# Patient Record
Sex: MALE | Employment: FULL TIME | URBAN - METROPOLITAN AREA
[De-identification: names, ages, dates, MRNs, and addresses within clinical notes are randomized per-mention and may not be internally consistent; named-entity substitution may affect disease eponyms.]

---

## 2017-02-28 RX ORDER — MONTELUKAST SODIUM 10 MG/1
TABLET ORAL
Qty: 30 TABLET | Refills: 3 | Status: SHIPPED | OUTPATIENT
Start: 2017-02-28

## 2017-09-12 ENCOUNTER — OFFICE VISIT (OUTPATIENT)
Dept: SLEEP CENTER | Age: 47
End: 2017-09-12
Attending: INTERNAL MEDICINE
Payer: COMMERCIAL

## 2017-09-12 DIAGNOSIS — Z76.89 SLEEP CONCERN: Primary | ICD-10-CM

## 2017-09-12 PROCEDURE — 95810 POLYSOM 6/> YRS 4/> PARAM: CPT

## 2017-09-14 NOTE — PROCEDURES
[address] [facility]  Accredited by the [employer] ([employer])    PATIENT'S NAME: Panchito Ballesteros PHYSICIAN:    REFERRING PHYSICIAN:    PATIENT ACCOUNT #: [de-identified] LOCATION: [place] [id number] #: [id number] hypnotics, and caffeine. 3.   Sleep study results do not meet the criteria for CPAP titration. 4.   Pharmacological therapy for periodic limb movement disorder is recommended. Dictated By Laurel Herrmann MD  d: 09/14/2017 11:29:22  t: 09/14/2017 12:06:59

## 2018-06-07 ENCOUNTER — OFFICE VISIT (OUTPATIENT)
Dept: OTOLARYNGOLOGY | Facility: CLINIC | Age: 48
End: 2018-06-07

## 2018-06-07 ENCOUNTER — TELEPHONE (OUTPATIENT)
Dept: OTOLARYNGOLOGY | Facility: CLINIC | Age: 48
End: 2018-06-07

## 2018-06-07 VITALS
HEIGHT: 71 IN | DIASTOLIC BLOOD PRESSURE: 82 MMHG | BODY MASS INDEX: 28 KG/M2 | SYSTOLIC BLOOD PRESSURE: 118 MMHG | TEMPERATURE: 98 F | WEIGHT: 200 LBS

## 2018-06-07 DIAGNOSIS — R42 MIGRAINOUS DIZZINESS: Primary | ICD-10-CM

## 2018-06-07 PROCEDURE — 99214 OFFICE O/P EST MOD 30 MIN: CPT | Performed by: OTOLARYNGOLOGY

## 2018-06-07 PROCEDURE — 99212 OFFICE O/P EST SF 10 MIN: CPT | Performed by: OTOLARYNGOLOGY

## 2018-06-07 RX ORDER — DICLOFENAC SODIUM 75 MG/1
75 TABLET, DELAYED RELEASE ORAL
COMMUNITY
Start: 2018-03-12

## 2018-06-07 RX ORDER — TRAZODONE HYDROCHLORIDE 50 MG/1
50 TABLET ORAL
COMMUNITY
Start: 2018-03-14

## 2018-06-07 RX ORDER — IBUPROFEN 800 MG/1
TABLET ORAL
Refills: 0 | COMMUNITY
Start: 2018-06-01

## 2018-06-07 RX ORDER — ALPRAZOLAM 0.25 MG/1
TABLET ORAL
COMMUNITY
Start: 2018-03-14

## 2018-06-07 RX ORDER — IBUPROFEN 800 MG/1
800 TABLET ORAL
COMMUNITY
Start: 2018-06-01 | End: 2018-06-07

## 2018-06-07 RX ORDER — MECLIZINE HYDROCHLORIDE 25 MG/1
TABLET ORAL
Qty: 30 TABLET | Refills: 0 | Status: SHIPPED | OUTPATIENT
Start: 2018-06-07

## 2018-06-07 RX ORDER — FLUTICASONE PROPIONATE 50 MCG
SPRAY, SUSPENSION (ML) NASAL
Refills: 0 | COMMUNITY
Start: 2018-05-31

## 2018-06-07 RX ORDER — AMOXICILLIN AND CLAVULANATE POTASSIUM 875; 125 MG/1; MG/1
1 TABLET, FILM COATED ORAL EVERY 12 HOURS
Qty: 20 TABLET | Refills: 0 | Status: SHIPPED | OUTPATIENT
Start: 2018-06-07 | End: 2018-06-17

## 2018-06-07 RX ORDER — LEVOFLOXACIN 500 MG/1
500 TABLET, FILM COATED ORAL DAILY
Qty: 7 TABLET | Refills: 0 | Status: SHIPPED | OUTPATIENT
Start: 2018-06-07 | End: 2018-06-14

## 2018-06-07 RX ORDER — METHYLPREDNISOLONE 4 MG/1
TABLET ORAL
Qty: 1 PACKAGE | Refills: 0 | Status: SHIPPED | OUTPATIENT
Start: 2018-06-07

## 2018-06-07 NOTE — PROGRESS NOTES
Talha Moe is a 49year old male.   Patient presents with:  Dizziness: daily for 1.5 weeks  Ear Problem: pt states he has fluid in both ears       HISTORY OF PRESENT ILLNESS  6/4/2016 visit with PEDRO  He presents with a history of shoulder surgery with th Never Used                        Alcohol use:  No              Drug use: No            Other Topics            Concern  Caffeine Concern        Yes    Comment:Soda        Family History   Problem Relation Age of Onset   • Heart Disorder Paternal Grandfathe Normal, Left: Normal.   Neurological Normal Memory - Normal. Cranial nerves - Cranial nerves II through XII grossly intact.    Head/Face Normal Facial features - Normal. Eyebrows - Normal. Skull - Normal.        Nasopharynx Normal External nose - Normal. Mackenzie Arteaga 1  •  OXYCONTIN 10 MG Oral Tablet Extended Release 12 hour Abuse-Deterrent, , Disp: , Rfl: 0  ASSESSMENT AND PLAN    1.dizziness  I think his dizziness is multifactorial he certainly has a positional component but he has this pervasive sense of unsteadines

## 2018-06-07 NOTE — TELEPHONE ENCOUNTER
Pt asking if levaquin can be prescribed instead of amoxicillin, states it works better for him. Pls advise thank you.

## 2018-06-11 ENCOUNTER — TELEPHONE (OUTPATIENT)
Dept: OTOLARYNGOLOGY | Facility: CLINIC | Age: 48
End: 2018-06-11

## 2018-06-11 RX ORDER — BENZONATATE 100 MG/1
100 CAPSULE ORAL 3 TIMES DAILY PRN
Qty: 60 CAPSULE | Refills: 1 | Status: SHIPPED | OUTPATIENT
Start: 2018-06-11 | End: 2018-09-08

## 2018-06-11 NOTE — TELEPHONE ENCOUNTER
appt 6-7-18. Pt is not feeling any better. Pt requesting a rx. Pt also requesting a doctors note for today.   Call pt to advise

## 2018-06-11 NOTE — TELEPHONE ENCOUNTER
Pt had OV 6/7, dizziness. Per pt's wife, pt had a cough at LOV, still has bad cough, still feels like ears are full, still feeling dizzy. Pt was given a note excusing him from work until today.   Pt requesting tessalon pearls for cough, still taking Augme

## 2018-06-18 ENCOUNTER — TELEPHONE (OUTPATIENT)
Dept: ADMINISTRATIVE | Age: 48
End: 2018-06-18

## 2018-06-18 ENCOUNTER — TELEPHONE (OUTPATIENT)
Dept: OTOLARYNGOLOGY | Facility: CLINIC | Age: 48
End: 2018-06-18

## 2018-06-18 ENCOUNTER — OFFICE VISIT (OUTPATIENT)
Dept: OTOLARYNGOLOGY | Facility: CLINIC | Age: 48
End: 2018-06-18

## 2018-06-18 VITALS
HEART RATE: 90 BPM | DIASTOLIC BLOOD PRESSURE: 70 MMHG | RESPIRATION RATE: 16 BRPM | BODY MASS INDEX: 28 KG/M2 | SYSTOLIC BLOOD PRESSURE: 128 MMHG | TEMPERATURE: 99 F | HEIGHT: 71 IN | WEIGHT: 200 LBS

## 2018-06-18 DIAGNOSIS — R42 MIGRAINOUS DIZZINESS: Primary | ICD-10-CM

## 2018-06-18 PROCEDURE — 99212 OFFICE O/P EST SF 10 MIN: CPT | Performed by: OTOLARYNGOLOGY

## 2018-06-18 PROCEDURE — 99213 OFFICE O/P EST LOW 20 MIN: CPT | Performed by: OTOLARYNGOLOGY

## 2018-06-18 RX ORDER — AZITHROMYCIN 250 MG/1
TABLET, FILM COATED ORAL
Qty: 1 PACKAGE | Refills: 0 | Status: SHIPPED | OUTPATIENT
Start: 2018-06-18

## 2018-06-18 NOTE — TELEPHONE ENCOUNTER
Spoke w/ pt's wife, pt will have surgery, needs short term disab. form completed. [employer] packet emailed ([email or web address]) to pt, upset about charge.  NK

## 2018-06-18 NOTE — TELEPHONE ENCOUNTER
Pt c/o of cost from [employer] regarding short term disabilityI form to be filled out today. Per , she will fill out form. Pt will drop off today.

## 2018-06-18 NOTE — PROGRESS NOTES
Seferino Small is a 49year old male. Patient presents with:  Dizziness: follow up, dizziness improved, still with productive cough, green sputum.       HISTORY OF PRESENT ILLNESS  6/4/2016 visit with PEDRO  He presents with a history of shoulder surgery with Smoker                                                                Packs/day: 0.50      Years: 12.00       Smokeless tobacco: Never Used                        Alcohol use:  No              Drug use: No            Other Topics            Concern  Angela Normal, Left: Normal. Pupil - Right: Normal, Left: Normal. Fundus - Right: Normal, Left: Normal.   Neurological Normal Memory - Normal. Cranial nerves - Cranial nerves II through XII grossly intact.    Head/Face Normal Facial features - Normal. Eyebrows - N 1/2- 1 tablet PO every 6 hrs PRN for dizziness, Disp: 30 tablet, Rfl: 0  •  HYDROcodone-acetaminophen  MG Oral Tab, , Disp: , Rfl: 0  •  Meclizine HCl 25 MG Oral Tab, , Disp: , Rfl: 0  •  Ondansetron HCl (ZOFRAN) 4 mg tablet, , Disp: , Rfl: 1  •  OXY

## 2018-06-20 NOTE — TELEPHONE ENCOUNTER
Completed form by Dr. Neville Lights forwarded to [facility] via dept, logged and scanned under [employer] tab, n/c form completed by doctor. Call completed.  NK

## 2018-08-08 RX ORDER — LEVOFLOXACIN 500 MG/1
TABLET, FILM COATED ORAL
Qty: 7 TABLET | Refills: 0 | OUTPATIENT
Start: 2018-08-08

## 2018-09-12 RX ORDER — BENZONATATE 100 MG/1
CAPSULE ORAL
Qty: 60 CAPSULE | Refills: 1 | Status: SHIPPED | OUTPATIENT
Start: 2018-09-12

## 2021-01-29 ENCOUNTER — OFFICE VISIT (OUTPATIENT)
Dept: OTOLARYNGOLOGY | Facility: CLINIC | Age: 51
End: 2021-01-29
Payer: COMMERCIAL

## 2021-01-29 VITALS
WEIGHT: 210 LBS | TEMPERATURE: 98 F | HEIGHT: 71 IN | BODY MASS INDEX: 29.4 KG/M2 | SYSTOLIC BLOOD PRESSURE: 128 MMHG | DIASTOLIC BLOOD PRESSURE: 90 MMHG

## 2021-01-29 DIAGNOSIS — R42 DIZZINESS: Primary | ICD-10-CM

## 2021-01-29 PROCEDURE — 3080F DIAST BP >= 90 MM HG: CPT | Performed by: OTOLARYNGOLOGY

## 2021-01-29 PROCEDURE — 3008F BODY MASS INDEX DOCD: CPT | Performed by: OTOLARYNGOLOGY

## 2021-01-29 PROCEDURE — 99214 OFFICE O/P EST MOD 30 MIN: CPT | Performed by: OTOLARYNGOLOGY

## 2021-01-29 PROCEDURE — 3074F SYST BP LT 130 MM HG: CPT | Performed by: OTOLARYNGOLOGY

## 2021-01-29 RX ORDER — CELECOXIB 200 MG/1
200 CAPSULE ORAL DAILY PRN
Qty: 30 CAPSULE | Refills: 0 | Status: SHIPPED | OUTPATIENT
Start: 2021-01-29 | End: 2021-02-28

## 2021-01-29 RX ORDER — AMOXICILLIN AND CLAVULANATE POTASSIUM 875; 125 MG/1; MG/1
1 TABLET, FILM COATED ORAL EVERY 12 HOURS
Qty: 20 TABLET | Refills: 0 | Status: SHIPPED | OUTPATIENT
Start: 2021-01-29

## 2021-01-29 RX ORDER — CYCLOBENZAPRINE HCL 5 MG
5 TABLET ORAL NIGHTLY
Qty: 30 TABLET | Refills: 1 | Status: SHIPPED | OUTPATIENT
Start: 2021-01-29 | End: 2021-06-03

## 2021-01-29 NOTE — PROGRESS NOTES
Lesvia Ordoñez is a 45year old male.   Patient presents with:  Dizziness: Patient Presents with: Dizziness for 1 week       HISTORY OF PRESENT ILLNESS  He presents with a history of shoulder surgery with the onset of severe spinning vertigo about 2 weeks la file    Tobacco Use      Smoking status: Former Smoker        Packs/day: 0.50        Years: 12.00        Pack years: 6      Smokeless tobacco: Never Used    Substance and Sexual Activity      Alcohol use: No      Drug use: No    Other Topics      Concerns: Normal.   Eyes Normal Conjunctiva - Right: Normal, Left: Normal. Pupil - Right: Normal, Left: Normal. Fundus - Right: Normal, Left: Normal.   Neurological Normal Memory - Normal. Cranial nerves - Cranial nerves II through XII grossly intact.    Head/Face No (ZITHROMAX Z-NICOLE) 250 MG Oral Tab, Take 1 by oral route every day for 5 days. 2 tablets today.  (Patient not taking: Reported on 1/29/2021 ), Disp: 1 Package, Rfl: 0  •  ALPRAZolam 0.25 MG Oral Tab, 1 tab as needed for flying, Disp: , Rfl:   •  Diclofenac S still exist    Jalen Medeiros MD    1/29/2021    2:20 PM

## 2021-01-30 ENCOUNTER — TELEPHONE (OUTPATIENT)
Dept: OTOLARYNGOLOGY | Facility: CLINIC | Age: 51
End: 2021-01-30

## 2021-01-30 NOTE — TELEPHONE ENCOUNTER
Dr Urbano American patient stated started taking the medications prescribed yesterday and feeling  little better ,advised to continue taking the medications,and reminded the need to follow up in a week ,pt verbalized understanding.

## 2021-02-02 ENCOUNTER — TELEPHONE (OUTPATIENT)
Dept: OTOLARYNGOLOGY | Facility: CLINIC | Age: 51
End: 2021-02-02

## 2021-02-02 NOTE — TELEPHONE ENCOUNTER
Susan Zayas MD  [facility]             Please contact patient Saturday morning to make sure that he is doing better

## 2021-02-08 DIAGNOSIS — Z11.59 SPECIAL SCREENING EXAMINATION FOR UNSPECIFIED VIRAL DISEASE: Primary | ICD-10-CM

## 2021-02-10 ENCOUNTER — LAB SERVICES (OUTPATIENT)
Dept: LAB | Age: 51
End: 2021-02-10

## 2021-02-10 LAB
SARS-COV-2 RNA RESP QL NAA+PROBE: NOT DETECTED
SERVICE CMNT-IMP: NORMAL
SERVICE CMNT-IMP: NORMAL

## 2021-02-10 PROCEDURE — U0003 INFECTIOUS AGENT DETECTION BY NUCLEIC ACID (DNA OR RNA); SEVERE ACUTE RESPIRATORY SYNDROME CORONAVIRUS 2 (SARS-COV-2) (CORONAVIRUS DISEASE [COVID-19]), AMPLIFIED PROBE TECHNIQUE, MAKING USE OF HIGH THROUGHPUT TECHNOLOGIES AS DESCRIBED BY CMS-2020-01-R: HCPCS | Performed by: PSYCHIATRY & NEUROLOGY

## 2021-02-10 PROCEDURE — U0005 INFEC AGEN DETEC AMPLI PROBE: HCPCS | Performed by: PSYCHIATRY & NEUROLOGY

## 2021-04-02 DIAGNOSIS — Z11.59 SPECIAL SCREENING EXAMINATION FOR UNSPECIFIED VIRAL DISEASE: Primary | ICD-10-CM

## 2021-06-02 NOTE — TELEPHONE ENCOUNTER
LOV 1/29/21, RTC 1 week, no folllow up.     This refill request is being sent to the provider for the following reason:    [x]Patient did not complete follow up recommendations

## 2021-06-03 RX ORDER — CYCLOBENZAPRINE HCL 5 MG
TABLET ORAL
Qty: 30 TABLET | Refills: 1 | Status: SHIPPED | OUTPATIENT
Start: 2021-06-03 | End: 2021-08-04

## 2021-08-04 RX ORDER — CYCLOBENZAPRINE HCL 5 MG
TABLET ORAL
Qty: 30 TABLET | Refills: 1 | Status: SHIPPED | OUTPATIENT
Start: 2021-08-04 | End: 2021-10-20

## 2021-08-04 NOTE — TELEPHONE ENCOUNTER
This refill request is being sent to the provider for the following reason:  []Patient has not had an appointment within the past 12 months but has made an appointment on: ___  [x]Medication is not within protocol  []Patient did not complete follow up garret

## 2021-09-23 ENCOUNTER — OFFICE VISIT (OUTPATIENT)
Dept: OTOLARYNGOLOGY | Facility: CLINIC | Age: 51
End: 2021-09-23
Payer: COMMERCIAL

## 2021-09-23 VITALS — BODY MASS INDEX: 31.5 KG/M2 | WEIGHT: 225 LBS | TEMPERATURE: 97 F | HEIGHT: 71 IN

## 2021-09-23 DIAGNOSIS — H92.03 OTALGIA OF BOTH EARS: Primary | ICD-10-CM

## 2021-09-23 PROCEDURE — 99213 OFFICE O/P EST LOW 20 MIN: CPT | Performed by: OTOLARYNGOLOGY

## 2021-09-23 PROCEDURE — 3008F BODY MASS INDEX DOCD: CPT | Performed by: OTOLARYNGOLOGY

## 2021-10-10 NOTE — PROGRESS NOTES
Venkata Bradley is a 45year old male. Patient presents with:  Ear Pain: Pt has been having ear pain bilaterally past 5 days. States he was seen at urgent care yesterday. Pt received Augment and antibiotic ear drops yesterday.        HISTORY OF PRESENT ILLNES ear infection and started on Augmentin and antibiotic eardrops. Feels slightly better today.   Social History    Socioeconomic History      Marital status:     Tobacco Use      Smoking status: Former Smoker        Packs/day: 0.50        Years: 12.00 Inspection - Normal. Palpation - Normal. Parotid gland - Normal. Thyroid gland - Normal.   Eyes Normal Conjunctiva - Right: Normal, Left: Normal. Pupil - Right: Normal, Left: Normal. Fundus - Right: Normal, Left: Normal.   Neurological Normal Memory - Norm Diclofenac Sodium 75 MG Oral Tab EC, Take 75 mg by mouth. (Patient not taking: Reported on 9/22/2021), Disp: , Rfl:   •  TraZODone HCl 50 MG Oral Tab, Take 50 mg by mouth.  (Patient not taking: Reported on 9/22/2021), Disp: , Rfl:   •  Fluticasone Propionat

## 2021-10-20 RX ORDER — CYCLOBENZAPRINE HCL 5 MG
TABLET ORAL
Qty: 30 TABLET | Refills: 1 | Status: SHIPPED | OUTPATIENT
Start: 2021-10-20 | End: 2022-01-23

## 2022-01-23 RX ORDER — CYCLOBENZAPRINE HCL 5 MG
TABLET ORAL
Qty: 30 TABLET | Refills: 1 | Status: SHIPPED | OUTPATIENT
Start: 2022-01-23

## 2022-03-28 PROBLEM — Z95.5 STATUS POST PRIMARY ANGIOPLASTY WITH CORONARY STENT: Status: ACTIVE | Noted: 2022-03-28

## 2022-03-28 PROBLEM — I25.119 CORONARY ARTERY DISEASE INVOLVING NATIVE CORONARY ARTERY OF NATIVE HEART WITH ANGINA PECTORIS (HCC): Status: ACTIVE | Noted: 2022-03-28

## 2022-04-09 PROBLEM — K63.8219 SMALL INTESTINAL BACTERIAL OVERGROWTH: Status: ACTIVE | Noted: 2022-04-09

## 2022-04-09 PROBLEM — M54.16 LUMBAR RADICULOPATHY: Status: ACTIVE | Noted: 2019-03-11

## 2022-04-09 PROBLEM — K63.89 SMALL INTESTINAL BACTERIAL OVERGROWTH: Status: ACTIVE | Noted: 2022-04-09

## 2022-04-09 PROBLEM — F17.223: Status: ACTIVE | Noted: 2019-02-28

## 2022-05-20 NOTE — TELEPHONE ENCOUNTER
This refill request is being sent to the provider for the following reason:  []Patient has not had an appointment within the past 12 months but has made an appointment on: ___  [x]Medication is not within protocol LOV 09/23/2021  []Patient did not complete follow up recommendations  []Other: ___

## 2022-05-21 RX ORDER — CYCLOBENZAPRINE HCL 5 MG
TABLET ORAL
Qty: 30 TABLET | Refills: 1 | Status: SHIPPED | OUTPATIENT
Start: 2022-05-21

## 2022-08-03 NOTE — TELEPHONE ENCOUNTER
LOV 9/23/21, no appt    This refill request is being sent to the provider for the following reason:  [x]Medication is not within protocol

## 2022-08-07 RX ORDER — CYCLOBENZAPRINE HCL 5 MG
TABLET ORAL
Qty: 30 TABLET | Refills: 1 | Status: SHIPPED | OUTPATIENT
Start: 2022-08-07

## 2022-10-14 RX ORDER — CYCLOBENZAPRINE HCL 5 MG
TABLET ORAL
Qty: 30 TABLET | Refills: 1 | OUTPATIENT
Start: 2022-10-14

## 2023-03-07 NOTE — TELEPHONE ENCOUNTER
This refill request is being sent to the provider for the following reason:  []Patient has not had an appointment within the past 12 months but has made an appointment on: ___  []Medication is not within protocol  [x]Patient did not complete follow up recommendations  []Other: ___  LOV was 9/23/21. Dr. Salvador Dillon, please review. Thank you.

## 2023-03-11 RX ORDER — CYCLOBENZAPRINE HCL 5 MG
TABLET ORAL
Qty: 30 TABLET | Refills: 1 | Status: SHIPPED | OUTPATIENT
Start: 2023-03-11

## 2023-05-08 RX ORDER — CYCLOBENZAPRINE HCL 5 MG
TABLET ORAL
Qty: 30 TABLET | Refills: 0 | Status: SHIPPED | OUTPATIENT
Start: 2023-05-08

## 2023-06-05 RX ORDER — CYCLOBENZAPRINE HCL 5 MG
TABLET ORAL
Qty: 30 TABLET | Refills: 0 | OUTPATIENT
Start: 2023-06-05

## 2023-12-16 ENCOUNTER — OFFICE VISIT (OUTPATIENT)
Dept: OTOLARYNGOLOGY | Facility: CLINIC | Age: 53
End: 2023-12-16

## 2023-12-16 DIAGNOSIS — H92.02 LEFT EAR PAIN: Primary | ICD-10-CM

## 2023-12-16 PROCEDURE — 99213 OFFICE O/P EST LOW 20 MIN: CPT | Performed by: OTOLARYNGOLOGY

## 2023-12-16 RX ORDER — CYCLOBENZAPRINE HCL 5 MG
5 TABLET ORAL NIGHTLY
Qty: 30 TABLET | Refills: 1 | Status: SHIPPED | OUTPATIENT
Start: 2023-12-16

## 2023-12-16 RX ORDER — CELECOXIB 200 MG/1
200 CAPSULE ORAL DAILY PRN
Qty: 30 CAPSULE | Refills: 0 | Status: SHIPPED | OUTPATIENT
Start: 2023-12-16 | End: 2024-01-15

## 2023-12-16 NOTE — PROGRESS NOTES
Joya Chua is a 46year old male. Chief Complaint   Patient presents with    Ear Problem     Patient is here due to ear infection on left  ear x 2 months. Reports pain. HISTORY OF PRESENT ILLNESS  He presents with a history of shoulder surgery with the onset of severe spinning vertigo about 2 weeks later. He had been sleeping in one position which is unnatural for him since surgery. He did have a nerve block during the procedure. Since that first episode he has had several other episodes with severe spinning vertigo that lasts for hours. His last spinning vertiginous episode occurred more than one week ago, He continues to have issues with lightheadedness and a sense of head fullness. He was seen by a PA from his orthopedists office who mentioned that these more recent symptoms may occur after a nerve block. No hearing loss. Audiogram performed today reveals normal hearing and no abnormalities. 1/29/21 he developed dizziness and ear pain simultaneously about a week ago. He does have a known history of benign positional vertigo and had an Epley maneuver performed several years ago after an orthopedic surgery. Dizziness was essentially gone up until about a week ago. Pain in the ear is throbbing and he can barely sleep due to the discomfort. Dizziness is spinning in nature last for 30 seconds at a time and occurs with movement of the head. He does have a problem with a fractured molar that is been present for some time. He thinks that it may be a problem but the pain is primarily in his ear and in front of the ear. Feels like there is water in his canal but his hearing is normal.  Seen in immediate care started on meclizine and told that he had a swollen eardrum and possible infection. No other signs, symptoms or complaints at this time     9/23/21 having ear pain for the past 5 or 6 days.   Seen in urgent care and told that he might have an ear infection and started on Augmentin and antibiotic eardrops. Feels slightly better today. 12/16/23 he has been having ear discomfort on the left for several months. States that he is seen several doctors and each time diagnosed with having an ear infection. He has been told that his ear canal and eardrum are very erythematous and red. He has been on an antibiotic initial time with no improvement of his symptoms and the second time he was on a 10-day course of antibiotics and eardrops and states that during that time he did have resolution of his discomfort. Pain slowly has recurred and at this time he complains of some muffling of sound and occasional crackling in the ear. Eustachian tube dysfunction? Denies grinding clenching. Social History     Socioeconomic History    Marital status:    Tobacco Use    Smoking status: Former     Packs/day: 0.50     Years: 12.00     Additional pack years: 0.00     Total pack years: 6.00     Types: Cigarettes    Smokeless tobacco: Never   Substance and Sexual Activity    Alcohol use: No    Drug use: No   Other Topics Concern    Caffeine Concern Yes     Comment: Soda       Family History   Problem Relation Age of Onset    Heart Disorder Paternal Grandfather     Diabetes Paternal Grandfather     Heart Disorder Father        Past Medical History:   Diagnosis Date    Hyperlipidemia        Past Surgical History:   Procedure Laterality Date    BACK SURGERY      EXCISION TURBINATE  1-    EXCISION TURBINATE,SUBMUCOUS  1-    OTHER SURGICAL HISTORY      ANKLE SURGERY 2010; REPEAT NOV 2011 DR. CHERYL [place]    OTHER SURGICAL HISTORY      Right meniscal repair     REPAIR OF NASAL SEPTUM  1-         REVIEW OF SYSTEMS    System Neg/Pos Details   Constitutional Negative Fatigue, fever and weight loss. ENMT Negative Drooling. Eyes Negative Blurred vision and vision changes. Respiratory Negative Dyspnea and wheezing.    Cardio Negative Chest pain, irregular heartbeat/palpitations and syncope. GI Negative Abdominal pain and diarrhea. Endocrine Negative Cold intolerance and heat intolerance. Neuro Negative Tremors. Psych Negative Anxiety and depression. Integumentary Negative Frequent skin infections, pigment change and rash. Hema/Lymph Negative Easy bleeding and easy bruising. PHYSICAL EXAM    There were no vitals taken for this visit. Constitutional Normal Overall appearance - Normal.   Psychiatric Normal Orientation - Oriented to time, place, person & situation. Appropriate mood and affect. Neck Exam Normal Inspection - Normal. Palpation - Normal. Parotid gland - Normal. Thyroid gland - Normal.   Eyes Normal Conjunctiva - Right: Normal, Left: Normal. Pupil - Right: Normal, Left: Normal. Fundus - Right: Normal, Left: Normal.   Neurological Normal Memory - Normal. Cranial nerves - Cranial nerves II through XII grossly intact. Head/Face Normal Facial features - Normal. Eyebrows - Normal. Skull - Normal.        Nasopharynx Normal External nose - Normal. Lips/teeth/gums - Normal. Tonsils - Normal. Oropharynx - Normal.   Ears Normal Inspection - Right: Normal, Left: Normal. Canal - Right: Normal, Left: Normal. TM - Right: Normal, Left: Tympanosclerosis no middle ear fluid no evidence of infection   Skin Normal Inspection - Normal.   TMJ  Mild tenderness left TMJ   Lymph Detail Normal Submental. Submandibular. Anterior cervical. Posterior cervical. Supraclavicular.         Nose/Mouth/Throat Normal External nose - Normal. Lips/teeth/gums - Normal. Tonsils - Normal. Oropharynx - Normal.   Nose/Mouth/Throat Normal Nares - Right: Normal Left: Normal. Septum -Normal  Turbinates - Right: Normal, Left: Normal.       Current Outpatient Medications:     celecoxib 200 MG Oral Cap, Take 1 capsule (200 mg total) by mouth daily as needed for Pain., Disp: 30 capsule, Rfl: 0    cyclobenzaprine 5 MG Oral Tab, Take 1 tablet (5 mg total) by mouth nightly., Disp: 30 tablet, Rfl: 1 CYCLOBENZAPRINE 5 MG Oral Tab, TAKE 1 TABLET BY MOUTH EVERY DAY AT NIGHT, Disp: 30 tablet, Rfl: 0    atorvastatin 80 MG Oral Tab, , Disp: , Rfl:     omega-3-acid ethyl esters 1 g Oral Cap, , Disp: , Rfl:     aspirin 325 MG Oral Tab EC, aspirin 325 mg tablet,delayed release  TAKE 1 TABLET BY MOUTH EVERY DAY, Disp: , Rfl:     albuterol 108 (90 Base) MCG/ACT Inhalation Aero Soln, Inhale 2 puffs into the lungs every 4 (four) hours as needed. , Disp: , Rfl:     ALPRAZolam 0.25 MG Oral Tab, 1 tab as needed for flying, Disp: , Rfl:   ASSESSMENT AND PLAN    1. Left ear pain  Tympanosclerosis noted of the tympanic membrane. Some moisture from recent use of eardrops no active infection at this time. I do suspect possibly some eustachian tube dysfunction but this would not likely cause the level of pain he is describing. I do suspect that there may be a musculoskeletal component as he does complain of temporal headaches on the left side as well. I have asked him to start Celebrex and cyclobenzaprine as well as warm heat soft diet and chewing on both sides of mouth. Return to see me in a few weeks if no improvement and we will perform audiogram.  We did discuss that if this pain continues he may benefit from a CT scan of the temporal bones to rule out some other significant cause of his pain        This note was prepared using [employer] [facility] voice recognition dictation software. As a result errors may occur. When identified these errors have been corrected. While every attempt is made to correct errors during dictation discrepancies may still exist    Jalen Rowe MD    12/16/2023    3:19 PM

## (undated) NOTE — LETTER
6/18/2018          To Whom It May Concern:    Bianca Andrade is currently under my medical care. He may return to work on 06/19/2018. Activity is restricted as follows: none. If you require additional information please contact our office.         Since

## (undated) NOTE — LETTER
6/7/2018          To Whom It May Concern:    Ralph Pitts is currently under my medical care and may not return to work at this time. Please excuse Avery Thompson for 4 days. He may return to work on 6-11-18 unless dizziness and vertigo continues.    Activity is